# Patient Record
(demographics unavailable — no encounter records)

---

## 2024-11-20 NOTE — ASSESSMENT
[FreeTextEntry1] : 1. Gout - check uric acid level - on colchicine as needed, no flare since the last visit - uric acid at 6.1 - continue with colchicine as needed 2. UTI -  - repeat urine - had E.coli on last urine, but family did not gave her the antibiotics - as she had no symptoms.    I reviewed previous labs results with patients. Laboratory tests ordered today Diagnosis and Prognosis discussed Continue with current medications medications refilled education provided on craberry juice F/u 3-4 months

## 2024-11-20 NOTE — HISTORY OF PRESENT ILLNESS
[FreeTextEntry1] : no flares of gout - since the last visit - uric acid at 6.1  not taking any colchicine at this time did not complete antibiotics for UTI reports sleeping several hour every day including naps .

## 2025-02-25 NOTE — ASSESSMENT
[FreeTextEntry1] : 1. Gout - HLA  positive - cannot have allopurinol - continue with colchicine as needed - only flaring every 4-5 months 2. UTI -  - repeat urine   3. hip and lumbar pain with difficulty walking - send for x-rays - can take meloxican as needed   I reviewed previous labs results with patients. Laboratory tests ordered today Diagnosis and Prognosis discussed Continue with current medications medications refilled education provided on craberry juice F/u 3-4 months

## 2025-02-25 NOTE — HISTORY OF PRESENT ILLNESS
[___ Month(s) Ago] : [unfilled] month(s) ago [FreeTextEntry1] : having difficulty ambulating she is able to get up but cannot walk by herself denies any swelling on any joints at this time.  reports worse pain over the right hip - antalgic gait.

## 2025-02-25 NOTE — PHYSICAL EXAM
[General Appearance - Alert] : alert [General Appearance - In No Acute Distress] : in no acute distress [Neck Appearance] : the appearance of the neck was normal [Neck Cervical Mass (___cm)] : no neck mass was observed [Jugular Venous Distention Increased] : there was no jugular-venous distention [Thyroid Diffuse Enlargement] : the thyroid was not enlarged [Thyroid Nodule] : there were no palpable thyroid nodules [Auscultation Breath Sounds / Voice Sounds] : lungs were clear to auscultation bilaterally [Heart Rate And Rhythm] : heart rate was normal and rhythm regular [Heart Sounds] : normal S1 and S2 [Heart Sounds Gallop] : no gallops [Murmurs] : no murmurs [Heart Sounds Pericardial Friction Rub] : no pericardial rub [Full Pulse] : the pedal pulses are present [Edema] : there was no peripheral edema [Bowel Sounds] : normal bowel sounds [Abdomen Soft] : soft [Abdomen Tenderness] : non-tender [Abdomen Mass (___ Cm)] : no abdominal mass palpated [Cervical Lymph Nodes Enlarged Posterior Bilaterally] : posterior cervical [Cervical Lymph Nodes Enlarged Anterior Bilaterally] : anterior cervical [Supraclavicular Lymph Nodes Enlarged Bilaterally] : supraclavicular [No CVA Tenderness] : no ~M costovertebral angle tenderness [No Spinal Tenderness] : no spinal tenderness [Abnormal Walk] : normal gait [Nail Clubbing] : no clubbing  or cyanosis of the fingernails [Musculoskeletal - Swelling] : no joint swelling seen [Motor Tone] : muscle strength and tone were normal [Skin Color & Pigmentation] : normal skin color and pigmentation [Skin Turgor] : normal skin turgor [] : no rash [Oriented To Time, Place, And Person] : oriented to person, place, and time [Impaired Insight] : insight and judgment were intact [Affect] : the affect was normal [FreeTextEntry1] : bilateral hip with decreased ROM and pain on movement - all other joints with full ROM - no weakness

## 2025-05-28 NOTE — ASSESSMENT
[FreeTextEntry1] : 1. Gout - HLA  positive - cannot have allopurinol - continue with colchicine as needed - no flares since the last visit 2. UTI -  - repeated - related to diaper use - no signs of UTI today -  3. hip and lumbar pain with difficulty walking -resolved since taking antibiotic for UTI x-rays showed degenerative OA - no pain today   I reviewed previous labs results with patients. Diagnosis and Prognosis discussed Continue with current medications medications refilled F/u 3-4 months

## 2025-05-28 NOTE — HISTORY OF PRESENT ILLNESS
[___ Month(s) Ago] : [unfilled] month(s) ago [FreeTextEntry1] : no flares of gout since the last visit feels well and able to ambulate by herself no signs of UTI at this time has not needed any colchicine at this time